# Patient Record
Sex: MALE | Race: WHITE | NOT HISPANIC OR LATINO | URBAN - METROPOLITAN AREA
[De-identification: names, ages, dates, MRNs, and addresses within clinical notes are randomized per-mention and may not be internally consistent; named-entity substitution may affect disease eponyms.]

---

## 2017-04-03 ENCOUNTER — OUTPATIENT (OUTPATIENT)
Dept: OUTPATIENT SERVICES | Facility: HOSPITAL | Age: 82
LOS: 1 days | End: 2017-04-03
Payer: MEDICARE

## 2017-04-03 DIAGNOSIS — R13.10 DYSPHAGIA, UNSPECIFIED: ICD-10-CM

## 2017-04-03 PROCEDURE — G8997: CPT | Mod: CK

## 2017-04-03 PROCEDURE — G8998: CPT | Mod: CK

## 2017-04-03 PROCEDURE — 92611 MOTION FLUOROSCOPY/SWALLOW: CPT

## 2017-04-03 PROCEDURE — 74230 X-RAY XM SWLNG FUNCJ C+: CPT

## 2017-04-03 PROCEDURE — 74230 X-RAY XM SWLNG FUNCJ C+: CPT | Mod: 26

## 2017-04-03 PROCEDURE — G8996: CPT | Mod: CK

## 2017-04-08 ENCOUNTER — EMERGENCY (EMERGENCY)
Facility: HOSPITAL | Age: 82
LOS: 1 days | Discharge: HOME | End: 2017-04-08
Attending: EMERGENCY MEDICINE | Admitting: EMERGENCY MEDICINE
Payer: MEDICARE

## 2017-04-08 VITALS
HEIGHT: 72 IN | SYSTOLIC BLOOD PRESSURE: 186 MMHG | RESPIRATION RATE: 18 BRPM | TEMPERATURE: 99 F | HEART RATE: 69 BPM | OXYGEN SATURATION: 95 % | DIASTOLIC BLOOD PRESSURE: 95 MMHG | WEIGHT: 244.93 LBS

## 2017-04-08 VITALS
HEART RATE: 64 BPM | DIASTOLIC BLOOD PRESSURE: 84 MMHG | OXYGEN SATURATION: 97 % | TEMPERATURE: 98 F | RESPIRATION RATE: 20 BRPM | SYSTOLIC BLOOD PRESSURE: 152 MMHG

## 2017-04-08 DIAGNOSIS — G30.1 ALZHEIMER'S DISEASE WITH LATE ONSET: ICD-10-CM

## 2017-04-08 LAB
AMPHET UR-MCNC: NEGATIVE — SIGNIFICANT CHANGE UP
ANION GAP SERPL CALC-SCNC: 10 MMOL/L — SIGNIFICANT CHANGE UP (ref 5–17)
APAP SERPL-MCNC: <7.5 UG/ML — LOW (ref 10–26)
APPEARANCE UR: CLEAR — SIGNIFICANT CHANGE UP
BARBITURATES UR SCN-MCNC: NEGATIVE — SIGNIFICANT CHANGE UP
BASOPHILS # BLD AUTO: 0 K/UL — SIGNIFICANT CHANGE UP (ref 0–0.2)
BASOPHILS NFR BLD AUTO: 0.2 % — SIGNIFICANT CHANGE UP (ref 0–2)
BENZODIAZ UR-MCNC: NEGATIVE — SIGNIFICANT CHANGE UP
BILIRUB UR-MCNC: NEGATIVE — SIGNIFICANT CHANGE UP
BUN SERPL-MCNC: 19 MG/DL — SIGNIFICANT CHANGE UP (ref 8–20)
CALCIUM SERPL-MCNC: 8.7 MG/DL — SIGNIFICANT CHANGE UP (ref 8.6–10.2)
CHLORIDE SERPL-SCNC: 105 MMOL/L — SIGNIFICANT CHANGE UP (ref 98–107)
CO2 SERPL-SCNC: 25 MMOL/L — SIGNIFICANT CHANGE UP (ref 22–29)
COCAINE METAB.OTHER UR-MCNC: NEGATIVE — SIGNIFICANT CHANGE UP
COLOR SPEC: YELLOW — SIGNIFICANT CHANGE UP
CREAT SERPL-MCNC: 0.89 MG/DL — SIGNIFICANT CHANGE UP (ref 0.5–1.3)
DIFF PNL FLD: ABNORMAL
EOSINOPHIL # BLD AUTO: 0.8 K/UL — HIGH (ref 0–0.5)
EOSINOPHIL NFR BLD AUTO: 7.4 % — HIGH (ref 0–5)
EPI CELLS # UR: SIGNIFICANT CHANGE UP
ETHANOL SERPL-MCNC: <10 MG/DL — SIGNIFICANT CHANGE UP
GLUCOSE SERPL-MCNC: 112 MG/DL — SIGNIFICANT CHANGE UP (ref 70–115)
GLUCOSE UR QL: 100 MG/DL
HCT VFR BLD CALC: 38.4 % — LOW (ref 42–52)
HGB BLD-MCNC: 12.1 G/DL — LOW (ref 14–18)
KETONES UR-MCNC: NEGATIVE — SIGNIFICANT CHANGE UP
LEUKOCYTE ESTERASE UR-ACNC: NEGATIVE — SIGNIFICANT CHANGE UP
LYMPHOCYTES # BLD AUTO: 1.9 K/UL — SIGNIFICANT CHANGE UP (ref 1–4.8)
LYMPHOCYTES # BLD AUTO: 18.9 % — LOW (ref 20–55)
MCHC RBC-ENTMCNC: 26 PG — LOW (ref 27–31)
MCHC RBC-ENTMCNC: 31.5 G/DL — LOW (ref 32–36)
MCV RBC AUTO: 82.4 FL — SIGNIFICANT CHANGE UP (ref 80–94)
METHADONE UR-MCNC: NEGATIVE — SIGNIFICANT CHANGE UP
MONOCYTES # BLD AUTO: 0.8 K/UL — SIGNIFICANT CHANGE UP (ref 0–0.8)
MONOCYTES NFR BLD AUTO: 7.4 % — SIGNIFICANT CHANGE UP (ref 3–10)
NEUTROPHILS # BLD AUTO: 6.8 K/UL — SIGNIFICANT CHANGE UP (ref 1.8–8)
NEUTROPHILS NFR BLD AUTO: 65.7 % — SIGNIFICANT CHANGE UP (ref 37–73)
NITRITE UR-MCNC: NEGATIVE — SIGNIFICANT CHANGE UP
OPIATES UR-MCNC: NEGATIVE — SIGNIFICANT CHANGE UP
PCP SPEC-MCNC: SIGNIFICANT CHANGE UP
PCP UR-MCNC: NEGATIVE — SIGNIFICANT CHANGE UP
PH UR: 6.5 — SIGNIFICANT CHANGE UP (ref 4.8–8)
PLATELET # BLD AUTO: 239 K/UL — SIGNIFICANT CHANGE UP (ref 150–400)
POTASSIUM SERPL-MCNC: 4 MMOL/L — SIGNIFICANT CHANGE UP (ref 3.5–5.3)
POTASSIUM SERPL-SCNC: 4 MMOL/L — SIGNIFICANT CHANGE UP (ref 3.5–5.3)
PROT UR-MCNC: 500 MG/DL
RBC # BLD: 4.66 M/UL — SIGNIFICANT CHANGE UP (ref 4.6–6.2)
RBC # FLD: 17.8 % — HIGH (ref 11–15.6)
RBC CASTS # UR COMP ASSIST: ABNORMAL /HPF (ref 0–4)
SALICYLATES SERPL-MCNC: <2 MG/DL — LOW (ref 10–20)
SODIUM SERPL-SCNC: 140 MMOL/L — SIGNIFICANT CHANGE UP (ref 135–145)
SP GR SPEC: 1.01 — SIGNIFICANT CHANGE UP (ref 1.01–1.02)
THC UR QL: NEGATIVE — SIGNIFICANT CHANGE UP
UROBILINOGEN FLD QL: NEGATIVE MG/DL — SIGNIFICANT CHANGE UP
WBC # BLD: 10.3 K/UL — SIGNIFICANT CHANGE UP (ref 4.8–10.8)
WBC # FLD AUTO: 10.3 K/UL — SIGNIFICANT CHANGE UP (ref 4.8–10.8)
WBC UR QL: SIGNIFICANT CHANGE UP

## 2017-04-08 PROCEDURE — 99284 EMERGENCY DEPT VISIT MOD MDM: CPT | Mod: 25

## 2017-04-08 PROCEDURE — 99053 MED SERV 10PM-8AM 24 HR FAC: CPT

## 2017-04-08 PROCEDURE — 80048 BASIC METABOLIC PNL TOTAL CA: CPT

## 2017-04-08 PROCEDURE — 85027 COMPLETE CBC AUTOMATED: CPT

## 2017-04-08 PROCEDURE — 81001 URINALYSIS AUTO W/SCOPE: CPT

## 2017-04-08 PROCEDURE — 90792 PSYCH DIAG EVAL W/MED SRVCS: CPT

## 2017-04-08 PROCEDURE — 71045 X-RAY EXAM CHEST 1 VIEW: CPT

## 2017-04-08 PROCEDURE — 70450 CT HEAD/BRAIN W/O DYE: CPT | Mod: 26

## 2017-04-08 PROCEDURE — 80307 DRUG TEST PRSMV CHEM ANLYZR: CPT

## 2017-04-08 PROCEDURE — 70450 CT HEAD/BRAIN W/O DYE: CPT

## 2017-04-08 PROCEDURE — 71010: CPT | Mod: 26

## 2017-04-08 RX ORDER — HALOPERIDOL DECANOATE 100 MG/ML
0.5 INJECTION INTRAMUSCULAR ONCE
Qty: 0 | Refills: 0 | Status: COMPLETED | OUTPATIENT
Start: 2017-04-08 | End: 2017-04-08

## 2017-04-08 RX ORDER — ALPRAZOLAM 0.25 MG
0.25 TABLET ORAL ONCE
Qty: 0 | Refills: 0 | Status: DISCONTINUED | OUTPATIENT
Start: 2017-04-08 | End: 2017-04-08

## 2017-04-08 RX ADMIN — HALOPERIDOL DECANOATE 0.5 MILLIGRAM(S): 100 INJECTION INTRAMUSCULAR at 13:21

## 2017-04-08 NOTE — ED ADULT NURSE NOTE - ADDITIONAL PRINTED INSTRUCTIONS GIVEN
Patient discharged back to [facility] via [facility] transport with EMT Zinsou

## 2017-04-08 NOTE — ED BEHAVIORAL HEALTH ASSESSMENT NOTE - RISK ASSESSMENT
Pt is not suicidal or homicidal, at imminent risk to harm self and others and he  does not need inpatient psychiatry level of acre
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
## 2017-04-08 NOTE — ED ADULT NURSE REASSESSMENT NOTE - NS ED NURSE REASSESS COMMENT FT1
Pt refused to stay in wheechair.  Pt attempting to get out of wheelchair if not permitted to freely role around Emergency dept.  Psych MD at bedside agrees with placing patient in stretcher with soft vest restraint.  Pt verbally abusive and uncooperative.  Code patten called.  Able to calm patient enough and agreed to sitting on stetcher.  Will continue to monitor

## 2017-04-08 NOTE — ED ADULT NURSE REASSESSMENT NOTE - NS ED NURSE REASSESS COMMENT FT1
Patient had episode of agitation and aggression towards staff and was trying to walk unassisted and is unstable in gait.  Patient redirected without success. Restraint was applied as ordered and monitored per protocol.  Restrain now discontinued. Aide at bedside. Patient to be discharged. Awaiting transport. Will monitor.

## 2017-04-08 NOTE — ED BEHAVIORAL HEALTH ASSESSMENT NOTE - DESCRIPTION
pt was angry and agitate , responded to redirection, follows commands and agreed to take haldol po 0.25 mg pill to help with agitation. dementia Nursing home resident

## 2017-04-08 NOTE — ED PROVIDER NOTE - PROGRESS NOTE DETAILS
case d/w psych and will eval pt evaluated by Dr Sprague (psych) and states to give haldol 0.5 mg PO and may d/c back to facility.

## 2017-04-08 NOTE — ED BEHAVIORAL HEALTH ASSESSMENT NOTE - SUMMARY
81 YOWM with dementia and behavioral disturbances, agitated in ER but responded to redirection and agrees to take PO haldol 0.25 mg x1.

## 2017-04-08 NOTE — ED PROVIDER NOTE - OBJECTIVE STATEMENT
82 y/o male in ED sent from NH for aggressive behavior today.  pt states that he has a call bell in his room that the staff does not respond to so he gets agitated and upset and throws things.  pt denies any fever, HA, cp, sob, n/v/d/abd pain.

## 2017-08-29 ENCOUNTER — EMERGENCY (EMERGENCY)
Facility: HOSPITAL | Age: 82
LOS: 1 days | Discharge: OTHER INSTITUTION | End: 2017-08-29
Attending: EMERGENCY MEDICINE
Payer: MEDICARE

## 2017-08-29 PROCEDURE — 99285 EMERGENCY DEPT VISIT HI MDM: CPT

## 2017-08-30 VITALS
RESPIRATION RATE: 20 BRPM | SYSTOLIC BLOOD PRESSURE: 175 MMHG | HEART RATE: 61 BPM | WEIGHT: 255.96 LBS | HEIGHT: 72 IN | OXYGEN SATURATION: 99 % | TEMPERATURE: 98 F | DIASTOLIC BLOOD PRESSURE: 78 MMHG

## 2017-08-30 DIAGNOSIS — R69 ILLNESS, UNSPECIFIED: ICD-10-CM

## 2017-08-30 DIAGNOSIS — G30.8 OTHER ALZHEIMER'S DISEASE: ICD-10-CM

## 2017-08-30 LAB
ALBUMIN SERPL ELPH-MCNC: 2.9 G/DL — LOW (ref 3.3–5.2)
ALP SERPL-CCNC: 95 U/L — SIGNIFICANT CHANGE UP (ref 40–120)
ALT FLD-CCNC: 6 U/L — SIGNIFICANT CHANGE UP
AMPHET UR-MCNC: NEGATIVE — SIGNIFICANT CHANGE UP
ANION GAP SERPL CALC-SCNC: 14 MMOL/L — SIGNIFICANT CHANGE UP (ref 5–17)
APAP SERPL-MCNC: <7.5 UG/ML — LOW (ref 10–26)
APPEARANCE UR: CLEAR — SIGNIFICANT CHANGE UP
APTT BLD: 35.9 SEC — SIGNIFICANT CHANGE UP (ref 27.5–37.4)
AST SERPL-CCNC: 18 U/L — SIGNIFICANT CHANGE UP
BARBITURATES UR SCN-MCNC: NEGATIVE — SIGNIFICANT CHANGE UP
BASOPHILS # BLD AUTO: 0 K/UL — SIGNIFICANT CHANGE UP (ref 0–0.2)
BASOPHILS NFR BLD AUTO: 0.1 % — SIGNIFICANT CHANGE UP (ref 0–2)
BENZODIAZ UR-MCNC: POSITIVE
BILIRUB SERPL-MCNC: 0.5 MG/DL — SIGNIFICANT CHANGE UP (ref 0.4–2)
BILIRUB UR-MCNC: NEGATIVE — SIGNIFICANT CHANGE UP
BUN SERPL-MCNC: 27 MG/DL — HIGH (ref 8–20)
CALCIUM SERPL-MCNC: 9 MG/DL — SIGNIFICANT CHANGE UP (ref 8.6–10.2)
CHLORIDE SERPL-SCNC: 105 MMOL/L — SIGNIFICANT CHANGE UP (ref 98–107)
CO2 SERPL-SCNC: 23 MMOL/L — SIGNIFICANT CHANGE UP (ref 22–29)
COCAINE METAB.OTHER UR-MCNC: NEGATIVE — SIGNIFICANT CHANGE UP
COLOR SPEC: YELLOW — SIGNIFICANT CHANGE UP
CREAT SERPL-MCNC: 1.45 MG/DL — HIGH (ref 0.5–1.3)
DIFF PNL FLD: ABNORMAL
EOSINOPHIL # BLD AUTO: 0.4 K/UL — SIGNIFICANT CHANGE UP (ref 0–0.5)
EOSINOPHIL NFR BLD AUTO: 5.8 % — HIGH (ref 0–5)
EPI CELLS # UR: SIGNIFICANT CHANGE UP
GLUCOSE SERPL-MCNC: 152 MG/DL — HIGH (ref 70–115)
GLUCOSE UR QL: NEGATIVE MG/DL — SIGNIFICANT CHANGE UP
HCG UR QL: NEGATIVE — SIGNIFICANT CHANGE UP
HCT VFR BLD CALC: 33.3 % — LOW (ref 42–52)
HGB BLD-MCNC: 10.6 G/DL — LOW (ref 14–18)
INR BLD: 1.26 RATIO — HIGH (ref 0.88–1.16)
KETONES UR-MCNC: NEGATIVE — SIGNIFICANT CHANGE UP
LEUKOCYTE ESTERASE UR-ACNC: ABNORMAL
LYMPHOCYTES # BLD AUTO: 1.4 K/UL — SIGNIFICANT CHANGE UP (ref 1–4.8)
LYMPHOCYTES # BLD AUTO: 18.7 % — LOW (ref 20–55)
MAGNESIUM SERPL-MCNC: 1.9 MG/DL — SIGNIFICANT CHANGE UP (ref 1.6–2.6)
MCHC RBC-ENTMCNC: 27.8 PG — SIGNIFICANT CHANGE UP (ref 27–31)
MCHC RBC-ENTMCNC: 31.8 G/DL — LOW (ref 32–36)
MCV RBC AUTO: 87.4 FL — SIGNIFICANT CHANGE UP (ref 80–94)
METHADONE UR-MCNC: NEGATIVE — SIGNIFICANT CHANGE UP
MONOCYTES # BLD AUTO: 0.7 K/UL — SIGNIFICANT CHANGE UP (ref 0–0.8)
MONOCYTES NFR BLD AUTO: 9.2 % — SIGNIFICANT CHANGE UP (ref 3–10)
NEUTROPHILS # BLD AUTO: 4.9 K/UL — SIGNIFICANT CHANGE UP (ref 1.8–8)
NEUTROPHILS NFR BLD AUTO: 66.1 % — SIGNIFICANT CHANGE UP (ref 37–73)
NITRITE UR-MCNC: NEGATIVE — SIGNIFICANT CHANGE UP
OPIATES UR-MCNC: NEGATIVE — SIGNIFICANT CHANGE UP
PCP SPEC-MCNC: SIGNIFICANT CHANGE UP
PCP UR-MCNC: NEGATIVE — SIGNIFICANT CHANGE UP
PH UR: 5 — SIGNIFICANT CHANGE UP (ref 5–8)
PLATELET # BLD AUTO: 242 K/UL — SIGNIFICANT CHANGE UP (ref 150–400)
POTASSIUM SERPL-MCNC: 4.6 MMOL/L — SIGNIFICANT CHANGE UP (ref 3.5–5.3)
POTASSIUM SERPL-SCNC: 4.6 MMOL/L — SIGNIFICANT CHANGE UP (ref 3.5–5.3)
PROT SERPL-MCNC: 7 G/DL — SIGNIFICANT CHANGE UP (ref 6.6–8.7)
PROT UR-MCNC: 500 MG/DL
PROTHROM AB SERPL-ACNC: 13.9 SEC — HIGH (ref 9.8–12.7)
RBC # BLD: 3.81 M/UL — LOW (ref 4.6–6.2)
RBC # FLD: 16 % — HIGH (ref 11–15.6)
RBC CASTS # UR COMP ASSIST: SIGNIFICANT CHANGE UP /HPF (ref 0–4)
SALICYLATES SERPL-MCNC: <2 MG/DL — LOW (ref 10–20)
SODIUM SERPL-SCNC: 142 MMOL/L — SIGNIFICANT CHANGE UP (ref 135–145)
SP GR SPEC: 1.02 — SIGNIFICANT CHANGE UP (ref 1.01–1.02)
THC UR QL: NEGATIVE — SIGNIFICANT CHANGE UP
TROPONIN T SERPL-MCNC: 0.06 NG/ML — SIGNIFICANT CHANGE UP (ref 0–0.06)
UROBILINOGEN FLD QL: NEGATIVE MG/DL — SIGNIFICANT CHANGE UP
WBC # BLD: 7.5 K/UL — SIGNIFICANT CHANGE UP (ref 4.8–10.8)
WBC # FLD AUTO: 7.5 K/UL — SIGNIFICANT CHANGE UP (ref 4.8–10.8)
WBC UR QL: SIGNIFICANT CHANGE UP

## 2017-08-30 PROCEDURE — 93010 ELECTROCARDIOGRAM REPORT: CPT

## 2017-08-30 PROCEDURE — 71010: CPT | Mod: 26

## 2017-08-30 RX ORDER — POTASSIUM CHLORIDE 20 MEQ
10 PACKET (EA) ORAL ONCE
Qty: 0 | Refills: 0 | Status: COMPLETED | OUTPATIENT
Start: 2017-08-30 | End: 2017-08-30

## 2017-08-30 RX ORDER — TAMSULOSIN HYDROCHLORIDE 0.4 MG/1
0.4 CAPSULE ORAL ONCE
Qty: 0 | Refills: 0 | Status: COMPLETED | OUTPATIENT
Start: 2017-08-30 | End: 2017-08-30

## 2017-08-30 RX ORDER — HYDRALAZINE HCL 50 MG
25 TABLET ORAL THREE TIMES A DAY
Qty: 0 | Refills: 0 | Status: COMPLETED | OUTPATIENT
Start: 2017-08-30 | End: 2017-08-31

## 2017-08-30 RX ORDER — MAGNESIUM OXIDE 400 MG ORAL TABLET 241.3 MG
400 TABLET ORAL
Qty: 0 | Refills: 0 | Status: DISCONTINUED | OUTPATIENT
Start: 2017-08-30 | End: 2017-09-02

## 2017-08-30 RX ORDER — PANTOPRAZOLE SODIUM 20 MG/1
40 TABLET, DELAYED RELEASE ORAL
Qty: 0 | Refills: 0 | Status: DISCONTINUED | OUTPATIENT
Start: 2017-08-31 | End: 2017-09-02

## 2017-08-30 RX ORDER — METOPROLOL TARTRATE 50 MG
25 TABLET ORAL DAILY
Qty: 0 | Refills: 0 | Status: COMPLETED | OUTPATIENT
Start: 2017-08-30 | End: 2017-08-31

## 2017-08-30 RX ORDER — DIPHENHYDRAMINE HCL 50 MG
50 CAPSULE ORAL ONCE
Qty: 0 | Refills: 0 | Status: COMPLETED | OUTPATIENT
Start: 2017-08-30 | End: 2017-08-30

## 2017-08-30 RX ORDER — MIDAZOLAM HYDROCHLORIDE 1 MG/ML
2 INJECTION, SOLUTION INTRAMUSCULAR; INTRAVENOUS ONCE
Qty: 0 | Refills: 0 | Status: DISCONTINUED | OUTPATIENT
Start: 2017-08-30 | End: 2017-08-30

## 2017-08-30 RX ORDER — DIPHENHYDRAMINE HCL 50 MG
25 CAPSULE ORAL ONCE
Qty: 0 | Refills: 0 | Status: COMPLETED | OUTPATIENT
Start: 2017-08-30 | End: 2017-08-30

## 2017-08-30 RX ORDER — ATORVASTATIN CALCIUM 80 MG/1
20 TABLET, FILM COATED ORAL ONCE
Qty: 0 | Refills: 0 | Status: COMPLETED | OUTPATIENT
Start: 2017-08-30 | End: 2017-08-30

## 2017-08-30 RX ORDER — ISOSORBIDE MONONITRATE 60 MG/1
30 TABLET, EXTENDED RELEASE ORAL ONCE
Qty: 0 | Refills: 0 | Status: COMPLETED | OUTPATIENT
Start: 2017-08-30 | End: 2017-08-30

## 2017-08-30 RX ORDER — HALOPERIDOL DECANOATE 100 MG/ML
5 INJECTION INTRAMUSCULAR ONCE
Qty: 0 | Refills: 0 | Status: COMPLETED | OUTPATIENT
Start: 2017-08-30 | End: 2017-08-30

## 2017-08-30 RX ORDER — FUROSEMIDE 40 MG
40 TABLET ORAL ONCE
Qty: 0 | Refills: 0 | Status: COMPLETED | OUTPATIENT
Start: 2017-08-30 | End: 2017-08-30

## 2017-08-30 RX ORDER — IPRATROPIUM/ALBUTEROL SULFATE 18-103MCG
3 AEROSOL WITH ADAPTER (GRAM) INHALATION ONCE
Qty: 0 | Refills: 0 | Status: COMPLETED | OUTPATIENT
Start: 2017-08-30 | End: 2017-08-30

## 2017-08-30 RX ADMIN — Medication 2 MILLIGRAM(S): at 11:52

## 2017-08-30 RX ADMIN — HALOPERIDOL DECANOATE 5 MILLIGRAM(S): 100 INJECTION INTRAMUSCULAR at 05:15

## 2017-08-30 RX ADMIN — TAMSULOSIN HYDROCHLORIDE 0.4 MILLIGRAM(S): 0.4 CAPSULE ORAL at 21:55

## 2017-08-30 RX ADMIN — Medication 25 MILLIGRAM(S): at 17:02

## 2017-08-30 RX ADMIN — MAGNESIUM OXIDE 400 MG ORAL TABLET 400 MILLIGRAM(S): 241.3 TABLET ORAL at 17:00

## 2017-08-30 RX ADMIN — Medication 25 MILLIGRAM(S): at 11:52

## 2017-08-30 RX ADMIN — ISOSORBIDE MONONITRATE 30 MILLIGRAM(S): 60 TABLET, EXTENDED RELEASE ORAL at 17:03

## 2017-08-30 RX ADMIN — Medication 25 MILLIGRAM(S): at 22:20

## 2017-08-30 RX ADMIN — Medication 50 MILLIGRAM(S): at 05:15

## 2017-08-30 RX ADMIN — Medication 25 MILLIGRAM(S): at 17:03

## 2017-08-30 RX ADMIN — Medication 10 MILLIEQUIVALENT(S): at 17:03

## 2017-08-30 RX ADMIN — Medication 40 MILLIGRAM(S): at 17:03

## 2017-08-30 RX ADMIN — MIDAZOLAM HYDROCHLORIDE 2 MILLIGRAM(S): 1 INJECTION, SOLUTION INTRAMUSCULAR; INTRAVENOUS at 05:20

## 2017-08-30 RX ADMIN — ATORVASTATIN CALCIUM 20 MILLIGRAM(S): 80 TABLET, FILM COATED ORAL at 17:11

## 2017-08-30 NOTE — ED ADULT NURSE NOTE - CAS DISCH BELONGINGS RETURNED
jay to [facility] Transport team/Yes given to [facility] Transport team/Yes

## 2017-08-30 NOTE — ED BEHAVIORAL HEALTH NOTE - BEHAVIORAL HEALTH NOTE
SW Note: Pt seen by psychiatry and current plan is to transfer pt for inpt psychiatric care when medically stable. Called [facility] and [facility], there are no available male geriatric beds today for transfer. Notified Dr. Griffith. Pt still pending medical workup. SW to follow
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
## 2017-08-30 NOTE — ED PROVIDER NOTE - MEDICAL DECISION MAKING DETAILS
83 y/o M pt. Will obtain labs and give medicine PRN for agitation and clear for agitation. 81 y/o M pt. Will obtain labs and give medicine PRN for agitation and clear for agitation.  MEDICALLY CLEAR
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
## 2017-08-30 NOTE — ED BEHAVIORAL HEALTH NOTE - BEHAVIORAL HEALTH NOTE
LINNEA Note - calling AD.N for another pt and Siri mentioned they only had Lyly Male - presented pt but Ad.N states that "[facility] can not handle a O2 dependent pt in psych - no wall O2" [facility] may be the only unit that manage pt medically and psychiatrically. Additionally, Caitlin LIMA from [facility] called for update; explained circumstances and that EN would be updated when a bed was found for pt.

## 2017-08-30 NOTE — ED BEHAVIORAL HEALTH ASSESSMENT NOTE - CURRENT MEDICATION
Medication review report in Alpha tab .Aspirin 81mg, Atoravasin 20mg daily, flomax 0.4mg in the evening, haldol 2mg BID, Humalog sliding scale, Hydrogel to L heel in the evening, Ipratropium- Albuterol 0.5/2.5 every 4 hours, Isosorbide  ER 30mg daily, Lantus 30units at bedtime, Magnesium 400mg every 12 hours, Melatonin 3mg PRN insomnia, Metoprolol 25mg daily- hold for /80, Pantoprazole 40mg, Potassium 10 MEQ daily

## 2017-08-30 NOTE — ED PROVIDER NOTE - PHYSICAL EXAMINATION
VS: reviewed in triage note...  HEENT: NC/AT   CV:s1,s2 no m/r/g  Lungs: cta b/l, no r/r/w  Abd: soft, nt/nd, +bs  EXT: no c/c/e  Neuro:no focal defecits  skin: no rash  Pulses: dpp, pt 2+ b/l  psych: agitated, confused
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
## 2017-08-30 NOTE — ED BEHAVIORAL HEALTH ASSESSMENT NOTE - OTHER
sister- CHAMP Wagner [phone] CHAMP Carson (prior unit) [facility] peers Pt frustrated with diet of thickened liquids unable to assess low frustration tolerance in nursing home environment awaiting bed placement billing in [place]
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
## 2017-08-30 NOTE — ED BEHAVIORAL HEALTH ASSESSMENT NOTE - OTHER PAST PSYCHIATRIC HISTORY (INCLUDE DETAILS REGARDING ONSET, COURSE OF ILLNESS, INPATIENT/OUTPATIENT TREATMENT)
As per sister, pt saw a Psychiatrist at [facility]- she did not know his diagnosis or medications. She denies that patient has history of suicidal behavior or hospitalization.
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
## 2017-08-30 NOTE — ED PROVIDER NOTE - PMH
BPH (benign prostatic hypertrophy)    Diabetes mellitus type II    Diabetic peripheral neuropathy    H/O hyperlipidemia    H/O: HTN (hypertension)    Obese    PRB (rectal bleeding)  1998 equiring 3PRBC per patient; most recent colonscopy 1year ago with benign polypectomy per patient

## 2017-08-30 NOTE — CHART NOTE - NSCHARTNOTEFT_GEN_A_CORE
SOCIAL WORK NOTE: THIS WORKER DISUCSSED CASE WITH CHARANJIT POWELL.  AWAITNG PSYCH CONSULT AS NEEDED FOR POTENTIAL RETURN TO SNF.

## 2017-08-30 NOTE — ED BEHAVIORAL HEALTH ASSESSMENT NOTE - AXIS III
BPH (benign prostatic hypertrophy), Diabetes mellitus type II  ,Diabetic peripheral neuropathy, H/O hyperlipidemia, H/O: HTN (hypertension), Obese, PRB (rectal bleeding)  1998 requiring 3PRBC per patient; most recent colonoscopy 1year ago with benign polypectomy per records. Surgical HX right inguinal hernia repair; Hydrocele repaired.

## 2017-08-30 NOTE — ED BEHAVIORAL HEALTH ASSESSMENT NOTE - PRIMARY DX
Alzheimer's dementia with behavioral disturbance, unspecified timing of dementia onset Deferred condition on axis II

## 2017-08-30 NOTE — ED ADULT NURSE NOTE - CAS DISCH CONDITION
Behavior in Control following Haldol. Pt belongs given to Transport team/Improved Behavior in Control following Haldol. Pt belongs given to Transport team Pt calm and  cooperative upon discharge/Improved

## 2017-08-30 NOTE — ED BEHAVIORAL HEALTH ASSESSMENT NOTE - DESCRIPTION (FIRST USE, LAST USE, QUANTITY, FREQUENCY, DURATION)
Details unknown- per sister was smoker prior to long term care Details unknown- per sister was heavy drinker prior to long term care, states she believed in was in recovery but found liquor bottles when cleaning out his apt after move to LTC.

## 2017-08-31 VITALS
OXYGEN SATURATION: 98 % | TEMPERATURE: 98 F | HEART RATE: 84 BPM | SYSTOLIC BLOOD PRESSURE: 115 MMHG | DIASTOLIC BLOOD PRESSURE: 65 MMHG | RESPIRATION RATE: 20 BRPM

## 2017-08-31 DIAGNOSIS — I42.8 OTHER CARDIOMYOPATHIES: ICD-10-CM

## 2017-08-31 DIAGNOSIS — Z86.79 PERSONAL HISTORY OF OTHER DISEASES OF THE CIRCULATORY SYSTEM: ICD-10-CM

## 2017-08-31 DIAGNOSIS — R45.1 RESTLESSNESS AND AGITATION: ICD-10-CM

## 2017-08-31 LAB
CULTURE RESULTS: SIGNIFICANT CHANGE UP
SPECIMEN SOURCE: SIGNIFICANT CHANGE UP
TROPONIN T SERPL-MCNC: 0.06 NG/ML — SIGNIFICANT CHANGE UP (ref 0–0.06)

## 2017-08-31 PROCEDURE — G0480: CPT

## 2017-08-31 PROCEDURE — 87086 URINE CULTURE/COLONY COUNT: CPT

## 2017-08-31 PROCEDURE — 82962 GLUCOSE BLOOD TEST: CPT

## 2017-08-31 PROCEDURE — 80053 COMPREHEN METABOLIC PANEL: CPT

## 2017-08-31 PROCEDURE — 85610 PROTHROMBIN TIME: CPT

## 2017-08-31 PROCEDURE — 71045 X-RAY EXAM CHEST 1 VIEW: CPT

## 2017-08-31 PROCEDURE — 93005 ELECTROCARDIOGRAM TRACING: CPT

## 2017-08-31 PROCEDURE — 83735 ASSAY OF MAGNESIUM: CPT

## 2017-08-31 PROCEDURE — 81025 URINE PREGNANCY TEST: CPT

## 2017-08-31 PROCEDURE — 96374 THER/PROPH/DIAG INJ IV PUSH: CPT

## 2017-08-31 PROCEDURE — 84484 ASSAY OF TROPONIN QUANT: CPT

## 2017-08-31 PROCEDURE — 85027 COMPLETE CBC AUTOMATED: CPT

## 2017-08-31 PROCEDURE — 99285 EMERGENCY DEPT VISIT HI MDM: CPT | Mod: 25

## 2017-08-31 PROCEDURE — 80307 DRUG TEST PRSMV CHEM ANLYZR: CPT

## 2017-08-31 PROCEDURE — 36415 COLL VENOUS BLD VENIPUNCTURE: CPT

## 2017-08-31 PROCEDURE — 82553 CREATINE MB FRACTION: CPT

## 2017-08-31 PROCEDURE — 82550 ASSAY OF CK (CPK): CPT

## 2017-08-31 PROCEDURE — 81001 URINALYSIS AUTO W/SCOPE: CPT

## 2017-08-31 PROCEDURE — 85730 THROMBOPLASTIN TIME PARTIAL: CPT

## 2017-08-31 PROCEDURE — 96372 THER/PROPH/DIAG INJ SC/IM: CPT

## 2017-08-31 PROCEDURE — 99285 EMERGENCY DEPT VISIT HI MDM: CPT

## 2017-08-31 RX ORDER — PANTOPRAZOLE SODIUM 20 MG/1
1 TABLET, DELAYED RELEASE ORAL
Qty: 0 | Refills: 0 | COMMUNITY

## 2017-08-31 RX ORDER — METOPROLOL TARTRATE 50 MG
1 TABLET ORAL
Qty: 0 | Refills: 0 | COMMUNITY

## 2017-08-31 RX ORDER — HALOPERIDOL DECANOATE 100 MG/ML
2 INJECTION INTRAMUSCULAR
Qty: 0 | Refills: 0 | COMMUNITY

## 2017-08-31 RX ORDER — HALOPERIDOL DECANOATE 100 MG/ML
5 INJECTION INTRAMUSCULAR ONCE
Qty: 0 | Refills: 0 | Status: DISCONTINUED | OUTPATIENT
Start: 2017-08-31 | End: 2017-08-31

## 2017-08-31 RX ORDER — INSULIN GLARGINE 100 [IU]/ML
30 INJECTION, SOLUTION SUBCUTANEOUS
Qty: 0 | Refills: 0 | COMMUNITY

## 2017-08-31 RX ORDER — TAMSULOSIN HYDROCHLORIDE 0.4 MG/1
1 CAPSULE ORAL
Qty: 0 | Refills: 0 | COMMUNITY

## 2017-08-31 RX ORDER — HYDRALAZINE HCL 50 MG
25 TABLET ORAL
Qty: 0 | Refills: 0 | COMMUNITY

## 2017-08-31 RX ORDER — FUROSEMIDE 40 MG
1 TABLET ORAL
Qty: 0 | Refills: 0 | COMMUNITY

## 2017-08-31 RX ORDER — POTASSIUM CHLORIDE 20 MEQ
10 PACKET (EA) ORAL
Qty: 0 | Refills: 0 | COMMUNITY

## 2017-08-31 RX ORDER — HALOPERIDOL DECANOATE 100 MG/ML
5 INJECTION INTRAMUSCULAR ONCE
Qty: 0 | Refills: 0 | Status: COMPLETED | OUTPATIENT
Start: 2017-08-31 | End: 2017-08-31

## 2017-08-31 RX ORDER — INSULIN LISPRO 100/ML
0 VIAL (ML) SUBCUTANEOUS
Qty: 0 | Refills: 0 | COMMUNITY

## 2017-08-31 RX ADMIN — Medication 25 MILLIGRAM(S): at 07:09

## 2017-08-31 RX ADMIN — HALOPERIDOL DECANOATE 5 MILLIGRAM(S): 100 INJECTION INTRAMUSCULAR at 13:59

## 2017-08-31 RX ADMIN — Medication 25 MILLIGRAM(S): at 07:08

## 2017-08-31 RX ADMIN — MAGNESIUM OXIDE 400 MG ORAL TABLET 400 MILLIGRAM(S): 241.3 TABLET ORAL at 08:36

## 2017-08-31 NOTE — ED ADULT NURSE REASSESSMENT NOTE - CONDITION
Pt becoming slightly agitated.  Yelling nurse because he wants to stand up. Pt is non weight bearing due to neuropathy. Haldol 5mg PO taken. Pt is pending pickup. Remains on 1:1 observation for safety./deteriorated
Pt calm , awaken for accucheck. 159. Message left notifying Dr. Flynn. Pt straight cathed for Urine C&S. Labs drawn and sent. diaper changed, barrier applied, left unsecured to relieve pressure/unchanged
Pt sleep on 1:1/unchanged
unchanged/Pt awake o initial rounds. Alert, oriented. , friendly and cooperative. Pt resting comfortably in bed. V/S stable tenies pain and refused fluids or any food.. Awaiting transfer.
unchanged/Pt has 2 reddened lines where diaper elastic is. Pt states this has been bothering him a long time. when he urinates. Barrier cream placed and diaper left un secured to allow no pressure. No skin breakdown observed., Dr. Flynn observed.
unchanged
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
## 2017-08-31 NOTE — ED ADULT NURSE REASSESSMENT NOTE - STATUS
awaiting discharge, assessment change
awaiting transfer, no change

## 2017-08-31 NOTE — ED BEHAVIORAL HEALTH NOTE - BEHAVIORAL HEALTH NOTE
SW Note: Pt will be transferred to [facility] for inpt psychiatric tx. Accepting Dr. Del CHAMBERS. 9.37 legals completed. Ambulance arranged with [facility], aware pt is on 2L of O2. Spoke with pts sister, Lela Ramirez [phone] and admissions dept at [facility]. Both aware of plan and in agreement. Called ins plan listed on face sheet to inquire about auth for admit, [facility] [phone]. Spoke with Aayush, policy terminated 5/31/17. Notified [facility] ER registration office.

## 2017-08-31 NOTE — ED ADULT NURSE REASSESSMENT NOTE - NS ED NURSE REASSESS COMMENT FT1
1:1 maintained, no incidents to report, will continue to monitor and maintain safety.
pt maintained on 1:1 aide at bedside , restless but sleeping, no aggressive behavior at this present time, will continue to monitor and maintain safety.
pt remains on a 1:1 for  at bedside, continues of oxygen, currently calm and cooperative.  Will remain overnight no beds for inpatient, will continue to monitor and maintain safety.
Patient on constant observation due to confusion and O2 tubing for infusion via n/c. Able to accept medication as ordered. Refused nebulizer treatment, tried frequently to remove O2. Resting soundly in bed at this time, O2 sats remain at 100%. Assisted with care after incontinent of urine. Continuing to monitor, awaiting bed for transfer to inpatient geriatric care.
Patient arrived to  from main ED. Confused and oppositional at times to care and treatment. O2 sats 86% upon arrival to unit. Order obtained to place pt on O2 at 2L/min via nasal canula. Patient O2 sats now at 100%. Offered and accepted PO medications as ordered. Needs prompts and direction when becoming confused and attempting to get out of bed without assistance. Staff at bedside to monitor. Incontinent of urine, needs tended to by staff, Continuing to search for geriatric bed for inpatient care.
Patient remains on constant observation due to confusion and restlessness. Oppositional and needs firm direction and reorientation to eat meal and drink fluids. Remains on O2 at 2L/m and rqux=355%.. Resting in bed and watching TV. Accepted meds with much encouragement from staff.

## 2017-08-31 NOTE — ED ADULT NURSE REASSESSMENT NOTE - SYMPTOMS
none
none
reddened area thin red lines on upper inner aspects on thighs
Incontinence
none
none
confusion
confusion
none

## 2017-08-31 NOTE — ED ADULT NURSE REASSESSMENT NOTE - COMFORT CARE
darkened lights/meal provided/po fluids offered/warm blanket provided
incontinent, wears diaper
repositioned/side rails up/darkened lights/warm blanket provided/po fluids offered
side rails up/warm blanket provided
warm blanket provided/repositioned/side rails up/po fluids offered

## 2017-08-31 NOTE — CONSULT NOTE ADULT - ASSESSMENT
82 male with hx of NICM (EF 20% per records from 2012), dementia with psychosis,  hypertension, hyperlipidemia, diabetes who presents after punching a nurse at the nursing facility.  He is now in the Behavioral Unit in the ER awaiting placement.  Cardiology consulted for an abnormal ECG - SR 70, 1st degree AV block, bifascicular block, Qtc 496 ms (after adjusting for HR and bundle branch block), one isolated PVC.  ECG 5 yrs prior did showed 1st degree AV block, QTc of 440 ms. At baseline AAO to name and year.  Denies chest pain, shortness of breath, dizziness, syncope.  No hx of recurrent falls per nursing home records.      Euvolemic on exam.  High risk of developing high degree AVB in setting of 1st degree AVB and bifascicular block.  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  In the setting of dementia with significant behavioral disturbance, implanting a device (PPM) would be more harmful than beneficial.  If he develops recurrent falls (non-mechanical, due to syncope), can consider leadless PPM in the future.      QTc is prolonged (496 ms, must correct for HR and BBB - 50 ms), however should not preclude him from receiving treatment from antipsychotic agents if needed.  Recommend obtaining a 12 lead ECG with each medication titration.  D/C metoprolol to allow HR to increase (leading to shorter QT).     D/W Dr. Jones 82 male with hx of NICM (EF 20% per records from 2012), dementia with psychosis,  hypertension, hyperlipidemia, diabetes who presents after punching a nurse at the nursing facility.  He is now in the Behavioral Unit in the ER awaiting placement.  Cardiology consulted for an abnormal ECG - SR 70, 1st degree AV block, bifascicular block, Qtc 496 ms (after adjusting for HR and bundle branch block), one isolated PVC.  ECG 5 yrs prior did showed 1st degree AV block, QTc of 440 ms. At baseline AAO to name and year.  Denies chest pain, shortness of breath, dizziness, syncope.  No hx of recurrent falls per nursing home records.      Euvolemic on exam.  High risk of developing high degree AVB in setting of 1st degree AVB and bifascicular block.  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  In the setting of dementia with significant behavioral disturbance, implanting a device (PPM) would be more harmful than beneficial.  If he develops recurrent falls (non-mechanical, due to syncope), can consider leadless PPM in the future.      QTc is prolonged (496 ms, must correct for HR and BBB - 50 ms), however should not preclude him from receiving treatment from antipsychotic agents if needed.  Recommend obtaining a 12 lead ECG with each medication titration.  D/C metoprolol to allow HR to increase (leading to shorter QT). Avoid AV sveta blocking agents. Avoid other QT prolonging agents as much as possible (ie quinolones)    D/W Dr. Jones

## 2017-08-31 NOTE — ED ADULT NURSE REASSESSMENT NOTE - GENERAL PATIENT STATE
comfortable appearance
anxious
comfortable appearance
resting/sleeping/comfortable appearance
resting/sleeping/comfortable appearance
no change observed
resting/sleeping/comfortable appearance
resting/sleeping/comfortable appearance

## 2017-09-01 LAB
CULTURE RESULTS: SIGNIFICANT CHANGE UP
SPECIMEN SOURCE: SIGNIFICANT CHANGE UP

## 2021-08-21 NOTE — ED BEHAVIORAL HEALTH ASSESSMENT NOTE - EMPLOYMENT
Called patient, conveyed normal stool samplings.  Discussed C. Difficile results.  Per Rebecca Peters NP:  If she is negative for C. Difficile I would order Bactrim 800-160 b.i.d. x7 days. I suggest that they start a probiotic and/or start eating yogurt while taking the antibiotic.     Bactrim prescribed per orders.  Sent to [facility] per patient's request.  No further questions or concerns at this time.  Patient verbalized understanding to follow up with PCP if symptoms persist.  We did discuss GI consult/follow up if diarrhea consists as well.              ----- Message from Liza Ivy PA-C sent at 8/21/2021  8:41 AM CDT -----  Please call the patient.  All stool sampling came back negative.  We were unable to test for C. Difficile because the stool needed to be completely liquid and was formed.  C. Difficile is not a concern.  Recommend following up with GI for the diarrhea.    Liza Ivy PA-C  Working under the direct supervision of Dr. Diego Arroyo      
Retired
